# Patient Record
Sex: MALE | Race: BLACK OR AFRICAN AMERICAN | Employment: FULL TIME | URBAN - METROPOLITAN AREA
[De-identification: names, ages, dates, MRNs, and addresses within clinical notes are randomized per-mention and may not be internally consistent; named-entity substitution may affect disease eponyms.]

---

## 2022-04-01 ENCOUNTER — HOSPITAL ENCOUNTER (EMERGENCY)
Age: 42
Discharge: LEFT BEFORE DISCHARGE | End: 2022-04-01
Attending: EMERGENCY MEDICINE

## 2022-04-01 ENCOUNTER — APPOINTMENT (OUTPATIENT)
Dept: CT IMAGING | Age: 42
End: 2022-04-01
Attending: EMERGENCY MEDICINE

## 2022-04-01 ENCOUNTER — APPOINTMENT (OUTPATIENT)
Dept: GENERAL RADIOLOGY | Age: 42
End: 2022-04-01
Attending: EMERGENCY MEDICINE

## 2022-04-01 VITALS
RESPIRATION RATE: 22 BRPM | DIASTOLIC BLOOD PRESSURE: 96 MMHG | SYSTOLIC BLOOD PRESSURE: 161 MMHG | HEART RATE: 67 BPM | WEIGHT: 200 LBS | HEIGHT: 69 IN | TEMPERATURE: 99 F | OXYGEN SATURATION: 97 % | BODY MASS INDEX: 29.62 KG/M2

## 2022-04-01 DIAGNOSIS — R07.9 ACUTE CHEST PAIN: ICD-10-CM

## 2022-04-01 DIAGNOSIS — I10 UNCONTROLLED HYPERTENSION: Primary | ICD-10-CM

## 2022-04-01 LAB
ALBUMIN SERPL-MCNC: 3.4 G/DL (ref 3.4–5)
ALBUMIN/GLOB SERPL: 0.9 {RATIO} (ref 1–2)
ALP LIVER SERPL-CCNC: 41 U/L
ALT SERPL-CCNC: 53 U/L
ANION GAP SERPL CALC-SCNC: 6 MMOL/L (ref 0–18)
AST SERPL-CCNC: 62 U/L (ref 15–37)
ATRIAL RATE: 80 BPM
BASOPHILS # BLD AUTO: 0.05 X10(3) UL (ref 0–0.2)
BASOPHILS NFR BLD AUTO: 0.6 %
BILIRUB SERPL-MCNC: 0.4 MG/DL (ref 0.1–2)
BUN BLD-MCNC: 11 MG/DL (ref 7–18)
CALCIUM BLD-MCNC: 8.7 MG/DL (ref 8.5–10.1)
CHLORIDE SERPL-SCNC: 109 MMOL/L (ref 98–112)
CO2 SERPL-SCNC: 27 MMOL/L (ref 21–32)
CREAT BLD-MCNC: 1.24 MG/DL
EOSINOPHIL # BLD AUTO: 0.19 X10(3) UL (ref 0–0.7)
EOSINOPHIL NFR BLD AUTO: 2.1 %
ERYTHROCYTE [DISTWIDTH] IN BLOOD BY AUTOMATED COUNT: 14.7 %
GLOBULIN PLAS-MCNC: 3.9 G/DL (ref 2.8–4.4)
GLUCOSE BLD-MCNC: 77 MG/DL (ref 70–99)
HCT VFR BLD AUTO: 41.7 %
HGB BLD-MCNC: 13.9 G/DL
IMM GRANULOCYTES # BLD AUTO: 0.03 X10(3) UL (ref 0–1)
IMM GRANULOCYTES NFR BLD: 0.3 %
LIPASE SERPL-CCNC: 178 U/L (ref 73–393)
LYMPHOCYTES # BLD AUTO: 1.97 X10(3) UL (ref 1–4)
LYMPHOCYTES NFR BLD AUTO: 21.8 %
MCH RBC QN AUTO: 30.8 PG (ref 26–34)
MCHC RBC AUTO-ENTMCNC: 33.3 G/DL (ref 31–37)
MCV RBC AUTO: 92.3 FL
MONOCYTES # BLD AUTO: 0.85 X10(3) UL (ref 0.1–1)
MONOCYTES NFR BLD AUTO: 9.4 %
NEUTROPHILS # BLD AUTO: 5.94 X10 (3) UL (ref 1.5–7.7)
NEUTROPHILS # BLD AUTO: 5.94 X10(3) UL (ref 1.5–7.7)
NEUTROPHILS NFR BLD AUTO: 65.8 %
OSMOLALITY SERPL CALC.SUM OF ELEC: 292 MOSM/KG (ref 275–295)
P AXIS: 34 DEGREES
P-R INTERVAL: 160 MS
PLATELET # BLD AUTO: 258 10(3)UL (ref 150–450)
POTASSIUM SERPL-SCNC: 4 MMOL/L (ref 3.5–5.1)
PROT SERPL-MCNC: 7.3 G/DL (ref 6.4–8.2)
Q-T INTERVAL: 352 MS
QRS DURATION: 88 MS
QTC CALCULATION (BEZET): 405 MS
R AXIS: 6 DEGREES
RBC # BLD AUTO: 4.52 X10(6)UL
SARS-COV-2 RNA RESP QL NAA+PROBE: NOT DETECTED
SODIUM SERPL-SCNC: 142 MMOL/L (ref 136–145)
T AXIS: -10 DEGREES
TROPONIN I HIGH SENSITIVITY: 25 NG/L
VENTRICULAR RATE: 80 BPM
WBC # BLD AUTO: 9 X10(3) UL (ref 4–11)

## 2022-04-01 PROCEDURE — 93005 ELECTROCARDIOGRAM TRACING: CPT

## 2022-04-01 PROCEDURE — 71045 X-RAY EXAM CHEST 1 VIEW: CPT | Performed by: EMERGENCY MEDICINE

## 2022-04-01 PROCEDURE — 85025 COMPLETE CBC W/AUTO DIFF WBC: CPT | Performed by: EMERGENCY MEDICINE

## 2022-04-01 PROCEDURE — 93010 ELECTROCARDIOGRAM REPORT: CPT

## 2022-04-01 PROCEDURE — 71260 CT THORAX DX C+: CPT | Performed by: EMERGENCY MEDICINE

## 2022-04-01 PROCEDURE — 99285 EMERGENCY DEPT VISIT HI MDM: CPT

## 2022-04-01 PROCEDURE — 83690 ASSAY OF LIPASE: CPT | Performed by: EMERGENCY MEDICINE

## 2022-04-01 PROCEDURE — 96374 THER/PROPH/DIAG INJ IV PUSH: CPT

## 2022-04-01 PROCEDURE — 84484 ASSAY OF TROPONIN QUANT: CPT | Performed by: EMERGENCY MEDICINE

## 2022-04-01 PROCEDURE — 80053 COMPREHEN METABOLIC PANEL: CPT | Performed by: EMERGENCY MEDICINE

## 2022-04-01 RX ORDER — MORPHINE SULFATE 4 MG/ML
4 INJECTION, SOLUTION INTRAMUSCULAR; INTRAVENOUS ONCE
Status: DISCONTINUED | OUTPATIENT
Start: 2022-04-01 | End: 2022-04-01

## 2022-04-01 RX ORDER — KETOROLAC TROMETHAMINE 30 MG/ML
30 INJECTION, SOLUTION INTRAMUSCULAR; INTRAVENOUS ONCE
Status: DISCONTINUED | OUTPATIENT
Start: 2022-04-01 | End: 2022-04-01

## 2022-04-01 RX ORDER — IOHEXOL 350 MG/ML
100 INJECTION, SOLUTION INTRAVENOUS
Status: COMPLETED | OUTPATIENT
Start: 2022-04-01 | End: 2022-04-01

## 2022-04-01 RX ORDER — METOPROLOL SUCCINATE 50 MG/1
50 TABLET, EXTENDED RELEASE ORAL DAILY
Qty: 30 TABLET | Refills: 0 | Status: SHIPPED | OUTPATIENT
Start: 2022-04-01

## 2022-04-01 RX ORDER — NITROGLYCERIN 0.4 MG/1
0.4 TABLET SUBLINGUAL ONCE
Status: COMPLETED | OUTPATIENT
Start: 2022-04-01 | End: 2022-04-01

## 2022-04-01 RX ORDER — METOPROLOL TARTRATE 5 MG/5ML
5 INJECTION INTRAVENOUS ONCE
Status: COMPLETED | OUTPATIENT
Start: 2022-04-01 | End: 2022-04-01

## 2023-07-05 ENCOUNTER — OFFICE VISIT (OUTPATIENT)
Dept: INTERNAL MEDICINE CLINIC | Facility: CLINIC | Age: 43
End: 2023-07-05

## 2023-07-05 VITALS
BODY MASS INDEX: 27.35 KG/M2 | HEIGHT: 69 IN | HEART RATE: 69 BPM | DIASTOLIC BLOOD PRESSURE: 62 MMHG | WEIGHT: 184.63 LBS | SYSTOLIC BLOOD PRESSURE: 122 MMHG

## 2023-07-05 DIAGNOSIS — I10 ESSENTIAL HYPERTENSION: ICD-10-CM

## 2023-07-05 DIAGNOSIS — Z00.00 ANNUAL PHYSICAL EXAM: Primary | ICD-10-CM

## 2023-07-05 PROCEDURE — 3078F DIAST BP <80 MM HG: CPT | Performed by: INTERNAL MEDICINE

## 2023-07-05 PROCEDURE — 99386 PREV VISIT NEW AGE 40-64: CPT | Performed by: INTERNAL MEDICINE

## 2023-07-05 PROCEDURE — 3008F BODY MASS INDEX DOCD: CPT | Performed by: INTERNAL MEDICINE

## 2023-07-05 PROCEDURE — 3074F SYST BP LT 130 MM HG: CPT | Performed by: INTERNAL MEDICINE

## 2023-07-05 RX ORDER — AMLODIPINE BESYLATE 10 MG/1
10 TABLET ORAL DAILY
COMMUNITY
Start: 2023-06-20 | End: 2023-07-05

## 2023-07-05 RX ORDER — AMLODIPINE BESYLATE 10 MG/1
10 TABLET ORAL DAILY
Qty: 90 TABLET | Refills: 3 | Status: SHIPPED | OUTPATIENT
Start: 2023-07-05

## 2023-07-05 NOTE — PATIENT INSTRUCTIONS
Your blood pressure today was excellent at 122/62 and your exam was normal.  Please come in soon for blood tests when you can. Continue amlodipine, healthy diet, and regular exercise. Return visit in 6 months.

## 2024-04-19 ENCOUNTER — OFFICE VISIT (OUTPATIENT)
Dept: INTERNAL MEDICINE CLINIC | Facility: CLINIC | Age: 44
End: 2024-04-19

## 2024-04-19 ENCOUNTER — LAB ENCOUNTER (OUTPATIENT)
Dept: LAB | Age: 44
End: 2024-04-19
Attending: INTERNAL MEDICINE
Payer: MEDICAID

## 2024-04-19 VITALS
HEART RATE: 92 BPM | HEIGHT: 69 IN | BODY MASS INDEX: 29.42 KG/M2 | DIASTOLIC BLOOD PRESSURE: 68 MMHG | SYSTOLIC BLOOD PRESSURE: 124 MMHG | WEIGHT: 198.63 LBS

## 2024-04-19 DIAGNOSIS — Z00.00 ANNUAL PHYSICAL EXAM: ICD-10-CM

## 2024-04-19 DIAGNOSIS — I10 ESSENTIAL HYPERTENSION: Primary | ICD-10-CM

## 2024-04-19 LAB
ALBUMIN SERPL-MCNC: 4.4 G/DL (ref 3.2–4.8)
ALBUMIN/GLOB SERPL: 1.5 {RATIO} (ref 1–2)
ALP LIVER SERPL-CCNC: 50 U/L
ALT SERPL-CCNC: 27 U/L
ANION GAP SERPL CALC-SCNC: 6 MMOL/L (ref 0–18)
AST SERPL-CCNC: 28 U/L (ref ?–34)
BILIRUB SERPL-MCNC: 0.5 MG/DL (ref 0.3–1.2)
BUN BLD-MCNC: 11 MG/DL (ref 9–23)
BUN/CREAT SERPL: 9.6 (ref 10–20)
CALCIUM BLD-MCNC: 10.1 MG/DL (ref 8.7–10.4)
CHLORIDE SERPL-SCNC: 107 MMOL/L (ref 98–112)
CHOLEST SERPL-MCNC: 186 MG/DL (ref ?–200)
CO2 SERPL-SCNC: 28 MMOL/L (ref 21–32)
CREAT BLD-MCNC: 1.15 MG/DL
DEPRECATED RDW RBC AUTO: 50.3 FL (ref 35.1–46.3)
EGFRCR SERPLBLD CKD-EPI 2021: 82 ML/MIN/1.73M2 (ref 60–?)
ERYTHROCYTE [DISTWIDTH] IN BLOOD BY AUTOMATED COUNT: 15.6 % (ref 11–15)
FASTING PATIENT LIPID ANSWER: NO
FASTING STATUS PATIENT QL REPORTED: NO
GLOBULIN PLAS-MCNC: 2.9 G/DL (ref 2.8–4.4)
GLUCOSE BLD-MCNC: 81 MG/DL (ref 70–99)
HCT VFR BLD AUTO: 42.6 %
HDLC SERPL-MCNC: 71 MG/DL (ref 40–59)
HGB BLD-MCNC: 14.8 G/DL
LDLC SERPL CALC-MCNC: 98 MG/DL (ref ?–100)
MCH RBC QN AUTO: 31 PG (ref 26–34)
MCHC RBC AUTO-ENTMCNC: 34.7 G/DL (ref 31–37)
MCV RBC AUTO: 89.3 FL
NONHDLC SERPL-MCNC: 115 MG/DL (ref ?–130)
OSMOLALITY SERPL CALC.SUM OF ELEC: 290 MOSM/KG (ref 275–295)
PLATELET # BLD AUTO: 249 10(3)UL (ref 150–450)
POTASSIUM SERPL-SCNC: 4.2 MMOL/L (ref 3.5–5.1)
PROT SERPL-MCNC: 7.3 G/DL (ref 5.7–8.2)
RBC # BLD AUTO: 4.77 X10(6)UL
SODIUM SERPL-SCNC: 141 MMOL/L (ref 136–145)
TRIGL SERPL-MCNC: 97 MG/DL (ref 30–149)
VLDLC SERPL CALC-MCNC: 16 MG/DL (ref 0–30)
WBC # BLD AUTO: 11.8 X10(3) UL (ref 4–11)

## 2024-04-19 PROCEDURE — 80061 LIPID PANEL: CPT

## 2024-04-19 PROCEDURE — 36415 COLL VENOUS BLD VENIPUNCTURE: CPT

## 2024-04-19 PROCEDURE — 99396 PREV VISIT EST AGE 40-64: CPT | Performed by: INTERNAL MEDICINE

## 2024-04-19 PROCEDURE — 80053 COMPREHEN METABOLIC PANEL: CPT

## 2024-04-19 PROCEDURE — 85027 COMPLETE CBC AUTOMATED: CPT

## 2024-04-19 RX ORDER — AMLODIPINE BESYLATE 10 MG/1
10 TABLET ORAL DAILY
Qty: 90 TABLET | Refills: 1 | Status: SHIPPED | OUTPATIENT
Start: 2024-04-19

## 2024-04-19 NOTE — PATIENT INSTRUCTIONS
Your blood pressure today was excellent at 124/68  Please come in soon when you can for blood tests  Continue amlodipine, healthy eating and regular exercise  Return visit in 6 months

## 2024-04-19 NOTE — H&P
Cedric Hussein is a 43 year old male who presents for a complete physical exam.   HPI:   His last visit here was for a physical in July 2023.  Labs ordered at that time were never done.  He has been feeling well and has no specific issues for discussion.    Past medical and past surgical histories reviewed, as outlined below.  Medications reviewed, as listed.  He also takes a number of dietary and workout supplements.  Medication allergies reviewed-none    No out of office BP monitoring.  Diet healthy.  He exercises 4 days weekly, biking and weights.  Weight up 14 pounds since physical July 2023.  Declines routinely recommended vaccinations.    Wt Readings from Last 4 Encounters:   04/19/24 198 lb 9.6 oz (90.1 kg)   07/05/23 184 lb 9.6 oz (83.7 kg)   04/01/22 200 lb (90.7 kg)   03/28/15 185 lb (83.9 kg)     Body mass index is 29.33 kg/m².     Current Outpatient Medications   Medication Sig Dispense Refill    amLODIPine 10 MG Oral Tab Take 1 tablet (10 mg total) by mouth daily. 90 tablet 1     No Known Allergies   Past Medical History:    Essential hypertension      History reviewed. No pertinent surgical history.   Family History   Problem Relation Age of Onset    Breast Cancer Maternal Grandmother     Hypertension Maternal Grandfather     Hypertension Maternal Aunt       Social History:  Social History     Socioeconomic History    Marital status: Single   Tobacco Use    Smoking status: Never    Smokeless tobacco: Never   Vaping Use    Vaping status: Every Day   Substance and Sexual Activity    Alcohol use: Yes     Comment: 3-4 shots of whiskey 3 days weekly    Drug use: Never     Social Determinants of Health      Received from [facility], [facility]    Housing Stability           REVIEW OF SYSTEMS:   GENERAL: No fever  LUNGS: No cough wheezing or shortness of breath  CARDIAC: No lightheadedness palpitations or chest pain  GI: No anorexia heartburn dysphagia nausea vomiting  abdominal pain diarrhea constipation or rectal bleeding  : No urinary frequency dysuria or hematuria  MUSCULOSKELETAL: No leg swelling  NEURO: No headaches    EXAM:   GENERAL: Pleasant male appearing well in no distress  /68   Pulse 92   Ht 5' 9\" (1.753 m)   Wt 198 lb 9.6 oz (90.1 kg)   BMI 29.33 kg/m²   HEENT: Anicteric, conjunctiva pink, oropharynx normal  NECK: Supple without mass or thyromegaly  NODES: No peripheral adenopathy  LUNGS: Resonant to percussion and clear to auscultation  CARDIAC: Rhythm regular S1 S2 normal without murmur or edema  ABDOMEN: Bowel sounds normal soft nontender without mass or hepatosplenomegaly  EXTREMITIES: Normal without cyanosis or clubbing  PULSES: 2+ bilateral dorsalis pedis and posterior tibial  NEURO: Reflexes 1-2+ bilaterally and symmetric     ASSESSMENT AND PLAN:   Cedric Hussein is a 43 year old male who presents for a complete physical exam.     1. Annual physical exam  Check CMP CBC and lipid profile when able.  Order sent  Continue amlodipine.  Prescription refill sent to pharmacy  Reinforced healthy diet and regular exercise  Return visit in 6 months for blood pressure check  - amLODIPine 10 MG Oral Tab; Take 1 tablet (10 mg total) by mouth daily.  Dispense: 90 tablet; Refill: 1  - Comp Metabolic Panel (14); Future  - CBC, Platelet; No Differential; Future  - Lipid Panel; Future    2. Essential hypertension  Well-controlled.  Continue amlodipine      Johnnie Jeffers MD  4/19/2024  1:18 PM

## 2024-08-13 DIAGNOSIS — Z00.00 ANNUAL PHYSICAL EXAM: ICD-10-CM

## 2024-08-16 RX ORDER — AMLODIPINE BESYLATE 10 MG/1
10 TABLET ORAL DAILY
Qty: 90 TABLET | Refills: 1 | OUTPATIENT
Start: 2024-08-16

## 2024-12-09 ENCOUNTER — OFFICE VISIT (OUTPATIENT)
Dept: INTERNAL MEDICINE CLINIC | Facility: CLINIC | Age: 44
End: 2024-12-09
Payer: MEDICAID

## 2024-12-09 VITALS
HEIGHT: 69 IN | HEART RATE: 75 BPM | OXYGEN SATURATION: 98 % | BODY MASS INDEX: 28.05 KG/M2 | SYSTOLIC BLOOD PRESSURE: 126 MMHG | WEIGHT: 189.38 LBS | DIASTOLIC BLOOD PRESSURE: 68 MMHG

## 2024-12-09 DIAGNOSIS — I10 ESSENTIAL HYPERTENSION: Primary | ICD-10-CM

## 2024-12-09 DIAGNOSIS — K43.9 VENTRAL HERNIA WITHOUT OBSTRUCTION OR GANGRENE: ICD-10-CM

## 2024-12-09 PROCEDURE — 99214 OFFICE O/P EST MOD 30 MIN: CPT | Performed by: INTERNAL MEDICINE

## 2024-12-09 RX ORDER — AMLODIPINE BESYLATE 10 MG/1
10 TABLET ORAL DAILY
Qty: 90 TABLET | Refills: 1 | Status: SHIPPED | OUTPATIENT
Start: 2024-12-09

## 2024-12-09 NOTE — PROGRESS NOTES
Cedric Hussein is a 44 year old male.   Chief Complaint   Patient presents with    Follow - Up     HPI:   Cedric presents this morning for follow-up of hypertension.      His last visit here was for a physical in April.  Labs done at that time were normal.  He has been feeling well.  No out of office BP monitoring.  Diet healthy and he has been exercising 4 days weekly, cardio and weights.  Weight down 9 pounds since physical in April.  Occasional dizziness if he forgets his medication.  No headaches.  No palpitations or chest pain.  No shortness of breath.    He does have an upper abdominal wall hernia which causes occasional discomfort and he is interested in having it repaired.    Current Outpatient Medications   Medication Sig Dispense Refill    amLODIPine 10 MG Oral Tab Take 1 tablet (10 mg total) by mouth daily. 90 tablet 1     Allergies[1]   Past Medical History:    Essential hypertension     History reviewed. No pertinent surgical history.   Social History:  Social History     Socioeconomic History    Marital status: Single   Tobacco Use    Smoking status: Never    Smokeless tobacco: Never   Vaping Use    Vaping status: Every Day   Substance and Sexual Activity    Alcohol use: Yes     Comment: 3-4 shots of whiskey 3 days weekly    Drug use: Never     Social Drivers of Health      Received from [facility], [facility]    Housing Stability        EXAM:   GENERAL: Pleasant male appearing well in no distress  /68   Pulse 75   Ht 5' 9\" (1.753 m)   Wt 189 lb 6.4 oz (85.9 kg)   SpO2 98%   BMI 27.97 kg/m²   LUNGS: Resonant to percussion and clear to auscultation  CARDIAC: Rhythm regular S1 S2 normal without murmur   ABDOMEN: Nontender upper abdominal wall hernia superior to the umbilicus.  Bowel sounds normal soft nontender       ASSESSMENT AND PLAN:   1. Essential hypertension  Well-controlled  Continue current medication.  Prescription refill for amlodipine sent to  pharmacy  Recommend return visit in 6 months for physical and labs  Given recent birth of his first daughter, recommend Tdap vaccine.  He declines    2. Ventral hernia without obstruction or gangrene  Contact information for General Surgery given  - SURGERY - INTERNAL      The patient indicates understanding of these issues and agrees to the plan.  The patient is asked to return in 6 months.    Johnnie Jeffers MD  12/9/2024  12:12 PM         [1] No Known Allergies

## 2024-12-09 NOTE — PATIENT INSTRUCTIONS
Your blood pressure today was excellent at 126/68  Continue amlodipine daily  Please schedule a physical in 6 months  Please schedule an appointment with a surgeon to discuss fixing your hernia

## 2025-05-06 ENCOUNTER — OFFICE VISIT (OUTPATIENT)
Dept: INTERNAL MEDICINE CLINIC | Facility: CLINIC | Age: 45
End: 2025-05-06
Payer: MEDICAID

## 2025-05-06 VITALS
HEART RATE: 67 BPM | TEMPERATURE: 98 F | WEIGHT: 196 LBS | OXYGEN SATURATION: 98 % | DIASTOLIC BLOOD PRESSURE: 74 MMHG | HEIGHT: 69 IN | SYSTOLIC BLOOD PRESSURE: 136 MMHG | BODY MASS INDEX: 29.03 KG/M2

## 2025-05-06 DIAGNOSIS — K42.9 UMBILICAL HERNIA WITHOUT OBSTRUCTION AND WITHOUT GANGRENE: ICD-10-CM

## 2025-05-06 DIAGNOSIS — Z00.00 ADULT GENERAL MEDICAL EXAM: ICD-10-CM

## 2025-05-06 DIAGNOSIS — Z12.5 SCREENING PSA (PROSTATE SPECIFIC ANTIGEN): ICD-10-CM

## 2025-05-06 DIAGNOSIS — I10 ESSENTIAL HYPERTENSION: Primary | ICD-10-CM

## 2025-05-06 PROCEDURE — 99214 OFFICE O/P EST MOD 30 MIN: CPT | Performed by: INTERNAL MEDICINE

## 2025-05-06 RX ORDER — AMLODIPINE BESYLATE 10 MG/1
10 TABLET ORAL DAILY
Qty: 90 TABLET | Refills: 1 | Status: SHIPPED | OUTPATIENT
Start: 2025-05-06

## 2025-05-06 NOTE — PROGRESS NOTES
The following individual(s) verbally consented to be recorded using ambient AI listening technology and understand that they can each withdraw their consent to this listening technology at any point by asking the clinician to turn off or pause the recording: Yes    Patient name: Cedric Jovita  Additional names:       Unknown if ever smoked

## 2025-05-06 NOTE — PROGRESS NOTES
Cedric Hussein is a 44 year old male.  Chief Complaint   Patient presents with    [facility]     HPI:        Cedric Hussein is a 44 year old male with hypertension who presents to [facility] and refill his blood pressure medication.    He takes amlodipine 10 mg daily for hypertension but has not taken it recently due to prescription refill issues after his previous physician retired. He recalls significantly elevated blood pressure when not on medication. His last recorded blood pressure was 136, which requires monitoring.    He has an umbilical hernia that is bothersome and seeks to address it before his upcoming birthday. The hernia is not currently obstructed.    He denies major operations, medication allergies, smoking, alcohol use, or recreational drug use. He is single with five children and works as a . His last blood test showed no diabetes, normal kidney and liver function, and good cholesterol levels. He acknowledges the need to repeat his blood tests. There is no family history of prostate cancer, colon cancer, or early heart attacks. He has no chest pain or shortness of breath.       Current Medications[1]   Past Medical History[2]   Past Surgical History[3]   Social History:  Short Social Hx on File[4]   Family History[5]   Allergies[6]     REVIEW OF SYSTEMS:   Review of Systems   Review of Systems   Constitutional: Negative for activity change, appetite change and fever.   HENT: Negative for congestion and voice change.    Respiratory: Negative for cough and shortness of breath.    Cardiovascular: Negative for chest pain.   Gastrointestinal: Negative for abdominal distention, abdominal pain and vomiting.   Genitourinary: Negative for hematuria.   Skin: Negative for wound.   Psychiatric/Behavioral: Negative for behavioral problems.   Wt Readings from Last 5 Encounters:   05/06/25 196 lb (88.9 kg)   12/09/24 189 lb 6.4 oz (85.9 kg)   04/19/24 198 lb 9.6 oz (90.1 kg)   07/05/23 184 lb  9.6 oz (83.7 kg)   04/01/22 200 lb (90.7 kg)     Body mass index is 28.94 kg/m².      EXAM:   /74   Pulse 67   Temp 97.6 °F (36.4 °C) (Temporal)   Ht 5' 9\" (1.753 m)   Wt 196 lb (88.9 kg)   SpO2 98%   BMI 28.94 kg/m²   Physical Exam   Constitutional:       Appearance: Normal appearance.   HENT:      Head: Normocephalic.   Eyes:      Conjunctiva/sclera: Conjunctivae normal.   Cardiovascular:      Rate and Rhythm: Normal rate and regular rhythm.      Heart sounds: Normal heart sounds. No murmur heard.  Pulmonary:      Effort: Pulmonary effort is normal.      Breath sounds: Normal breath sounds. No rhonchi or rales.   Abdominal:      General: Bowel sounds are normal.      Palpations: Abdomen is soft.      Tenderness: There is no abdominal tenderness.   Musculoskeletal:      Cervical back: Neck supple.      Right lower leg: No edema.      Left lower leg: No edema.   Skin:     General: Skin is warm and dry.   Neurological:      General: No focal deficit present.      Mental Status: He is alert and oriented to person, place, and time. Mental status is at baseline.   Psychiatric:         Mood and Affect: Mood normal.         Behavior: Behavior normal.   Umbilical hernia present    ASSESSMENT AND PLAN:   1. Essential hypertension    - CBC, Platelet; No Differential; Future  - Comp Metabolic Panel (14); Future  - Hemoglobin A1C; Future  - Lipid Panel; Future  - TSH W Reflex To Free T4; Future    2. Adult general medical exam    - CBC, Platelet; No Differential; Future  - Comp Metabolic Panel (14); Future  - Hemoglobin A1C; Future  - Lipid Panel; Future  - TSH W Reflex To Free T4; Future  - PSA Total, Screen; Future    3. Screening PSA (prostate specific antigen)    - PSA Total, Screen; Future    4. Umbilical hernia without obstruction and without gangrene    - Surgery Referral - In Network       Umbilical hernia  Umbilical hernia causing discomfort. Interested in surgical intervention before birthday. Advised  against heavy lifting.  - Refer to surgeon for evaluation and potential surgical intervention.  - Advise against lifting heavy objects.  - Encourage low-impact activities like biking.    Hypertension  Chronic hypertension managed with amlodipine 10 mg daily. Blood pressure acceptable at 136. Prescription refill issues noted. Previous higher readings without medication. Continued medication necessary for control.  - Prescribe amlodipine 10 mg daily and send prescription to [facility] pharmacy in [place].  - Advise to continue medication regularly.  - Monitor blood pressure regularly.    Wellness Visit  Routine wellness visit to establish care. No major operations or allergies. No smoking, alcohol, or drug use. Negative family history for prostate cancer, colon cancer, and early heart attacks. Previous tests showed no diabetes, normal kidney and liver function, good cholesterol, normal WBC count. Blood pressure acceptable at 136.  - Order blood tests including PSA and other routine screenings.  - Advise fasting before blood tests.  - Schedule colonoscopy at age 47.   .apsec  Plan: As above.  I will see him back in 1 year.  Medication prescribed, labs ordered, referral given for surgery.      The patient indicates understanding of these issues and agrees to the plan.  No follow-ups on file.    This note was prepared using Dragon Medical voice recognition dictation software. As a result errors may occur. When identified these errors have been corrected. While every attempt is made to correct errors during dictation discrepancies may still exist.       [1]   Current Outpatient Medications   Medication Sig Dispense Refill    amLODIPine 10 MG Oral Tab Take 1 tablet (10 mg total) by mouth daily. 90 tablet 1   [2]   Past Medical History:   Essential hypertension   [3] History reviewed. No pertinent surgical history.  [4]   Social History  Socioeconomic History    Marital status: Single   Tobacco Use    Smoking status: Never     Smokeless tobacco: Never   Vaping Use    Vaping status: Some Days    Substances: Nicotine   Substance and Sexual Activity    Alcohol use: Yes     Comment: 3-4 shots of whiskey 3 days weekly    Drug use: Never     Social Drivers of Health     Food Insecurity: No Food Insecurity (5/6/2025)    NCSS - Food Insecurity     Worried About Running Out of Food in the Last Year: No     Ran Out of Food in the Last Year: No   Transportation Needs: Unmet Transportation Needs (5/6/2025)    NCSS - Transportation     Lack of Transportation: Yes   Housing Stability: At Risk (5/6/2025)    NCSS - Housing/Utilities     Has Housing: Yes     Worried About Losing Housing: No     Unable to Get Utilities: Yes   [5]   Family History  Problem Relation Age of Onset    Breast Cancer Maternal Grandmother     Hypertension Maternal Grandfather     Hypertension Maternal Aunt    [6] No Known Allergies